# Patient Record
Sex: FEMALE | Race: WHITE | NOT HISPANIC OR LATINO | URBAN - METROPOLITAN AREA
[De-identification: names, ages, dates, MRNs, and addresses within clinical notes are randomized per-mention and may not be internally consistent; named-entity substitution may affect disease eponyms.]

---

## 2017-01-05 ENCOUNTER — INPATIENT (INPATIENT)
Facility: HOSPITAL | Age: 27
LOS: 3 days | Discharge: HOME | DRG: 917 | End: 2017-01-09
Attending: HOSPITALIST | Admitting: INTERNAL MEDICINE
Payer: COMMERCIAL

## 2017-01-05 VITALS
WEIGHT: 160.06 LBS | HEART RATE: 122 BPM | HEIGHT: 65 IN | SYSTOLIC BLOOD PRESSURE: 129 MMHG | OXYGEN SATURATION: 97 % | TEMPERATURE: 98 F | DIASTOLIC BLOOD PRESSURE: 82 MMHG | RESPIRATION RATE: 20 BRPM

## 2017-01-05 DIAGNOSIS — R73.9 HYPERGLYCEMIA, UNSPECIFIED: ICD-10-CM

## 2017-01-05 DIAGNOSIS — T40.1X1A POISONING BY HEROIN, ACCIDENTAL (UNINTENTIONAL), INITIAL ENCOUNTER: ICD-10-CM

## 2017-01-05 DIAGNOSIS — F11.188 OPIOID ABUSE WITH OTHER OPIOID-INDUCED DISORDER: ICD-10-CM

## 2017-01-05 DIAGNOSIS — T38.0X5A ADVERSE EFFECT OF GLUCOCORTICOIDS AND SYNTHETIC ANALOGUES, INITIAL ENCOUNTER: ICD-10-CM

## 2017-01-05 DIAGNOSIS — F41.9 ANXIETY DISORDER, UNSPECIFIED: ICD-10-CM

## 2017-01-05 DIAGNOSIS — J69.0 PNEUMONITIS DUE TO INHALATION OF FOOD AND VOMIT: ICD-10-CM

## 2017-01-05 DIAGNOSIS — T17.908A UNSPECIFIED FOREIGN BODY IN RESPIRATORY TRACT, PART UNSPECIFIED CAUSING OTHER INJURY, INITIAL ENCOUNTER: ICD-10-CM

## 2017-01-05 DIAGNOSIS — D72.828 OTHER ELEVATED WHITE BLOOD CELL COUNT: ICD-10-CM

## 2017-01-05 DIAGNOSIS — Z28.21 IMMUNIZATION NOT CARRIED OUT BECAUSE OF PATIENT REFUSAL: ICD-10-CM

## 2017-01-05 DIAGNOSIS — R21 RASH AND OTHER NONSPECIFIC SKIN ERUPTION: ICD-10-CM
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
## 2017-01-05 DIAGNOSIS — R09.02 HYPOXEMIA: ICD-10-CM

## 2017-01-05 LAB
ALBUMIN SERPL ELPH-MCNC: 4.3 G/DL — SIGNIFICANT CHANGE UP (ref 3.3–5.2)
ALP SERPL-CCNC: 67 U/L — SIGNIFICANT CHANGE UP (ref 40–120)
ALT FLD-CCNC: 13 U/L — SIGNIFICANT CHANGE UP
AMPHET UR-MCNC: NEGATIVE — SIGNIFICANT CHANGE UP
ANION GAP SERPL CALC-SCNC: 25 MMOL/L — HIGH (ref 5–17)
APAP SERPL-MCNC: <9.3 UG/ML — LOW (ref 10–26)
AST SERPL-CCNC: 19 U/L — SIGNIFICANT CHANGE UP
BARBITURATES UR SCN-MCNC: NEGATIVE — SIGNIFICANT CHANGE UP
BASE EXCESS BLDA CALC-SCNC: -4.5 MMOL/L — LOW (ref -3–3)
BENZODIAZ UR-MCNC: NEGATIVE — SIGNIFICANT CHANGE UP
BILIRUB SERPL-MCNC: 0.1 MG/DL — LOW (ref 0.4–2)
BLOOD GAS COMMENTS ARTERIAL: SIGNIFICANT CHANGE UP
BUN SERPL-MCNC: 21 MG/DL — HIGH (ref 8–20)
CALCIUM SERPL-MCNC: 8.7 MG/DL — SIGNIFICANT CHANGE UP (ref 8.6–10.2)
CHLORIDE SERPL-SCNC: 95 MMOL/L — LOW (ref 98–107)
CO2 SERPL-SCNC: 18 MMOL/L — LOW (ref 22–29)
COCAINE METAB.OTHER UR-MCNC: NEGATIVE — SIGNIFICANT CHANGE UP
CREAT SERPL-MCNC: 1.11 MG/DL — SIGNIFICANT CHANGE UP (ref 0.5–1.3)
ETHANOL SERPL-MCNC: <10 MG/DL — SIGNIFICANT CHANGE UP
GAS PNL BLDA: SIGNIFICANT CHANGE UP
GLUCOSE SERPL-MCNC: 269 MG/DL — HIGH (ref 70–115)
HCO3 BLDA-SCNC: 21 MMOL/L — SIGNIFICANT CHANGE UP (ref 20–26)
HCT VFR BLD CALC: 38.5 % — SIGNIFICANT CHANGE UP (ref 37–47)
HGB BLD-MCNC: 12.2 G/DL — SIGNIFICANT CHANGE UP (ref 12–16)
HOROWITZ INDEX BLDA+IHG-RTO: SIGNIFICANT CHANGE UP
LYMPHOCYTES # BLD AUTO: 17 % — LOW (ref 20–55)
MCHC RBC-ENTMCNC: 27.8 PG — SIGNIFICANT CHANGE UP (ref 27–31)
MCHC RBC-ENTMCNC: 31.7 G/DL — LOW (ref 32–36)
MCV RBC AUTO: 87.7 FL — SIGNIFICANT CHANGE UP (ref 81–99)
METHADONE UR-MCNC: NEGATIVE — SIGNIFICANT CHANGE UP
MONOCYTES NFR BLD AUTO: 10 % — SIGNIFICANT CHANGE UP (ref 3–10)
NEUTROPHILS NFR BLD AUTO: 67 % — SIGNIFICANT CHANGE UP (ref 37–73)
NEUTS BAND # BLD: 6 % — SIGNIFICANT CHANGE UP (ref 0–8)
NRBC # BLD: 1 /100 — HIGH (ref 0–0)
OPIATES UR-MCNC: POSITIVE
PCO2 BLDA: 52 MMHG — HIGH (ref 35–45)
PCP SPEC-MCNC: SIGNIFICANT CHANGE UP
PCP UR-MCNC: NEGATIVE — SIGNIFICANT CHANGE UP
PH BLDA: 7.25 — LOW (ref 7.35–7.45)
PLAT MORPH BLD: NORMAL — SIGNIFICANT CHANGE UP
PLATELET # BLD AUTO: 424 K/UL — HIGH (ref 150–400)
PO2 BLDA: 78 MMHG — LOW (ref 83–108)
POTASSIUM SERPL-MCNC: 3.9 MMOL/L — SIGNIFICANT CHANGE UP (ref 3.5–5.3)
POTASSIUM SERPL-SCNC: 3.9 MMOL/L — SIGNIFICANT CHANGE UP (ref 3.5–5.3)
PROT SERPL-MCNC: 7.8 G/DL — SIGNIFICANT CHANGE UP (ref 6.6–8.7)
RBC # BLD: 4.39 M/UL — LOW (ref 4.4–5.2)
RBC # FLD: 13.8 % — SIGNIFICANT CHANGE UP (ref 11–15.6)
RBC BLD AUTO: NORMAL — SIGNIFICANT CHANGE UP
SALICYLATES SERPL-MCNC: <2 MG/DL — LOW (ref 10–20)
SAO2 % BLDA: 93 % — LOW (ref 95–99)
SODIUM SERPL-SCNC: 138 MMOL/L — SIGNIFICANT CHANGE UP (ref 135–145)
THC UR QL: NEGATIVE — SIGNIFICANT CHANGE UP
WBC # BLD: 30.22 K/UL — HIGH (ref 4.8–10.8)
WBC # FLD AUTO: 30.22 K/UL — HIGH (ref 4.8–10.8)

## 2017-01-05 PROCEDURE — 99291 CRITICAL CARE FIRST HOUR: CPT

## 2017-01-05 PROCEDURE — 71010: CPT | Mod: 26

## 2017-01-05 PROCEDURE — 93010 ELECTROCARDIOGRAM REPORT: CPT

## 2017-01-05 RX ORDER — VANCOMYCIN HCL 1 G
VIAL (EA) INTRAVENOUS
Qty: 0 | Refills: 0 | Status: DISCONTINUED | OUTPATIENT
Start: 2017-01-05 | End: 2017-01-06

## 2017-01-05 RX ORDER — SODIUM CHLORIDE 9 MG/ML
1000 INJECTION INTRAMUSCULAR; INTRAVENOUS; SUBCUTANEOUS ONCE
Qty: 0 | Refills: 0 | Status: COMPLETED | OUTPATIENT
Start: 2017-01-05 | End: 2017-01-05

## 2017-01-05 RX ORDER — VANCOMYCIN HCL 1 G
1250 VIAL (EA) INTRAVENOUS ONCE
Qty: 0 | Refills: 0 | Status: COMPLETED | OUTPATIENT
Start: 2017-01-05 | End: 2017-01-05

## 2017-01-05 RX ORDER — AZTREONAM 2 G
VIAL (EA) INJECTION
Qty: 0 | Refills: 0 | Status: DISCONTINUED | OUTPATIENT
Start: 2017-01-05 | End: 2017-01-06

## 2017-01-05 RX ORDER — ONDANSETRON 8 MG/1
4 TABLET, FILM COATED ORAL ONCE
Qty: 0 | Refills: 0 | Status: COMPLETED | OUTPATIENT
Start: 2017-01-05 | End: 2017-01-05

## 2017-01-05 RX ORDER — VANCOMYCIN HCL 1 G
1250 VIAL (EA) INTRAVENOUS EVERY 12 HOURS
Qty: 0 | Refills: 0 | Status: DISCONTINUED | OUTPATIENT
Start: 2017-01-06 | End: 2017-01-06

## 2017-01-05 RX ORDER — NALOXONE HYDROCHLORIDE 4 MG/.1ML
2 SPRAY NASAL
Qty: 2 | Refills: 0 | Status: DISCONTINUED | OUTPATIENT
Start: 2017-01-05 | End: 2017-01-05

## 2017-01-05 RX ORDER — INFLUENZA VIRUS VACCINE 15; 15; 15; 15 UG/.5ML; UG/.5ML; UG/.5ML; UG/.5ML
0.5 SUSPENSION INTRAMUSCULAR ONCE
Qty: 0 | Refills: 0 | Status: DISCONTINUED | OUTPATIENT
Start: 2017-01-05 | End: 2017-01-09

## 2017-01-05 RX ORDER — NALOXONE HYDROCHLORIDE 4 MG/.1ML
2 SPRAY NASAL ONCE
Qty: 0 | Refills: 0 | Status: COMPLETED | OUTPATIENT
Start: 2017-01-05 | End: 2017-01-05

## 2017-01-05 RX ORDER — SODIUM CHLORIDE 9 MG/ML
1000 INJECTION, SOLUTION INTRAVENOUS
Qty: 0 | Refills: 0 | Status: DISCONTINUED | OUTPATIENT
Start: 2017-01-05 | End: 2017-01-06

## 2017-01-05 RX ORDER — AZTREONAM 2 G
1000 VIAL (EA) INJECTION ONCE
Qty: 0 | Refills: 0 | Status: COMPLETED | OUTPATIENT
Start: 2017-01-05 | End: 2017-01-05

## 2017-01-05 RX ORDER — ENOXAPARIN SODIUM 100 MG/ML
40 INJECTION SUBCUTANEOUS DAILY
Qty: 0 | Refills: 0 | Status: DISCONTINUED | OUTPATIENT
Start: 2017-01-05 | End: 2017-01-09

## 2017-01-05 RX ORDER — AZTREONAM 2 G
1000 VIAL (EA) INJECTION EVERY 6 HOURS
Qty: 0 | Refills: 0 | Status: DISCONTINUED | OUTPATIENT
Start: 2017-01-06 | End: 2017-01-06

## 2017-01-05 RX ORDER — ESCITALOPRAM OXALATE 10 MG/1
10 TABLET, FILM COATED ORAL AT BEDTIME
Qty: 0 | Refills: 0 | Status: DISCONTINUED | OUTPATIENT
Start: 2017-01-05 | End: 2017-01-09

## 2017-01-05 RX ADMIN — Medication 166.67 MILLIGRAM(S): at 22:14

## 2017-01-05 RX ADMIN — SODIUM CHLORIDE 75 MILLILITER(S): 9 INJECTION, SOLUTION INTRAVENOUS at 22:15

## 2017-01-05 RX ADMIN — ESCITALOPRAM OXALATE 10 MILLIGRAM(S): 10 TABLET, FILM COATED ORAL at 22:15

## 2017-01-05 RX ADMIN — SODIUM CHLORIDE 4000 MILLILITER(S): 9 INJECTION INTRAMUSCULAR; INTRAVENOUS; SUBCUTANEOUS at 18:08

## 2017-01-05 RX ADMIN — NALOXONE HYDROCHLORIDE 2 MILLIGRAM(S): 4 SPRAY NASAL at 18:17

## 2017-01-05 RX ADMIN — Medication 50 MILLIGRAM(S): at 22:15

## 2017-01-05 RX ADMIN — ONDANSETRON 4 MILLIGRAM(S): 8 TABLET, FILM COATED ORAL at 18:18

## 2017-01-05 NOTE — H&P ADULT. - HISTORY OF PRESENT ILLNESS
26F hx anxiety takes lexapro heroin.  Started smoking heroin last spring.  She injected heroin 3 times in the fall, then was admitted to drug rehab.  She was discharged in December and came to [facility] to live with father for a change of scenery.  Has been sober until today when she had a relapse and did "a lot" of heroin IV at 11:30 am today.  She arrived in ED and was awake.  Several hours later, found cyanotic with vomitus, received narcan, then awoke.  Now hypoxemic with dense L PNA aspiration likely.  She desats to 75% on RA.  She says she developed a rash on face and neck a few days ago and was prescribed oral steroids by [facility]  The rash is getting better per her report.

## 2017-01-05 NOTE — ED PROVIDER NOTE - OBJECTIVE STATEMENT
called to bedside, patient found by nurse unresponsive and blue in color, apneic, not spontaneously breathing no IV with pulse, patient bagged, color improved, narcan given 2mg followed by another 2, patient awoke, admits to using "a lot" of heroin earlier says didn't get narcan groggy, poor historian

## 2017-01-05 NOTE — H&P ADULT. - ASSESSMENT
26 F relapse IV heroin with Likely  aspiration PNA   Leukocytosis at least partially explained by outpatient prednisone for rash.   She was brought to ICU for concern of repeat narcotic effect and hypoxemia.

## 2017-01-05 NOTE — H&P ADULT. - PROBLEM SELECTOR PLAN 1
Watching for narcosis.  Has committed to returning to her outreach Drug counseling program . Watching for narcosis.  Has committed to returning to her outreach Drug counseling program .  Short ICU stay  Will culture blood owing to Leukocytosis

## 2017-01-05 NOTE — ED ADULT NURSE REASSESSMENT NOTE - NS ED NURSE REASSESS COMMENT FT1
Assuming care from previous RN, pt AOx3, pt able to stay awake, denies SOB, denies chest pain, denies n/v, denies pain, LS clear and equal bilaterally, skin warm and dry, patent 20G IV in left wrist, sinus tachycardia on monitor, pt on NRB @ 15L, abrasion to left forehead. Offers no complaints at this time. Will continue to monitor.

## 2017-01-05 NOTE — ED PROVIDER NOTE - PROGRESS NOTE DETAILS
pt more awake after narcan but still sluggish -cxr with ? aspiration, on 100% NRB sometimes desats into the low 90s- ICU called for eval - awaiting call back spoke with ICU for eval

## 2017-01-05 NOTE — ED ADULT TRIAGE NOTE - CHIEF COMPLAINT QUOTE
Pt biba after admittedly using 1 bag of heroin.  Pt was found awake on the floor by a friend.  Pt denies physical complaints other than her ears popping.  Denies SI/HI.  Belonging collected and secured. Pt placed in yellow gown.

## 2017-01-05 NOTE — ED PROVIDER NOTE - CRITICAL CARE PROVIDED
documentation/additional history taking/consultation with other physicians/interpretation of diagnostic studies/consult w/ pt's family directly relating to pts condition/direct patient care (not related to procedure)

## 2017-01-05 NOTE — ED ADULT NURSE REASSESSMENT NOTE - NS ED NURSE REASSESS COMMENT FT1
Pi responded to the narcan. Pt is axox4.Pt is tachycardic and tachipneic.. Pt is stable now. Continue to monitor pt.

## 2017-01-05 NOTE — H&P ADULT. - ATTENDING COMMENTS
26 yof with herion intoxication aspiration PNA - improved today this am ready for downgrade. Anna had been clean for 6 mos then relapsed - she is interested in returning to treatment.    - stable for transfer to floor Dr. Crane accepted  - clindamycin for aspiration PNA  - Social work consult for substance

## 2017-01-05 NOTE — PATIENT PROFILE ADULT. - NS PRO INDICAT FLU
Patient is 18 years or older/Patient lives with or cares for people at high risk for influenza complications

## 2017-01-06 DIAGNOSIS — T40.1X4D: ICD-10-CM

## 2017-01-06 DIAGNOSIS — J69.0 PNEUMONITIS DUE TO INHALATION OF FOOD AND VOMIT: ICD-10-CM

## 2017-01-06 DIAGNOSIS — R21 RASH AND OTHER NONSPECIFIC SKIN ERUPTION: ICD-10-CM

## 2017-01-06 LAB
ANION GAP SERPL CALC-SCNC: 10 MMOL/L — SIGNIFICANT CHANGE UP (ref 5–17)
BUN SERPL-MCNC: 12 MG/DL — SIGNIFICANT CHANGE UP (ref 8–20)
CALCIUM SERPL-MCNC: 8.2 MG/DL — LOW (ref 8.6–10.2)
CHLORIDE SERPL-SCNC: 100 MMOL/L — SIGNIFICANT CHANGE UP (ref 98–107)
CO2 SERPL-SCNC: 29 MMOL/L — SIGNIFICANT CHANGE UP (ref 22–29)
CREAT SERPL-MCNC: 0.61 MG/DL — SIGNIFICANT CHANGE UP (ref 0.5–1.3)
GLUCOSE SERPL-MCNC: 120 MG/DL — HIGH (ref 70–115)
HCT VFR BLD CALC: 33.4 % — LOW (ref 37–47)
HGB BLD-MCNC: 11.1 G/DL — LOW (ref 12–16)
MCHC RBC-ENTMCNC: 28 PG — SIGNIFICANT CHANGE UP (ref 27–31)
MCHC RBC-ENTMCNC: 33.2 G/DL — SIGNIFICANT CHANGE UP (ref 32–36)
MCV RBC AUTO: 84.1 FL — SIGNIFICANT CHANGE UP (ref 81–99)
PLATELET # BLD AUTO: 285 K/UL — SIGNIFICANT CHANGE UP (ref 150–400)
POTASSIUM SERPL-MCNC: 3.7 MMOL/L — SIGNIFICANT CHANGE UP (ref 3.5–5.3)
POTASSIUM SERPL-SCNC: 3.7 MMOL/L — SIGNIFICANT CHANGE UP (ref 3.5–5.3)
PROCALCITONIN SERPL-MCNC: 0.9 NG/ML — HIGH (ref 0–0.5)
RBC # BLD: 3.97 M/UL — LOW (ref 4.4–5.2)
RBC # FLD: 13.5 % — SIGNIFICANT CHANGE UP (ref 11–15.6)
SODIUM SERPL-SCNC: 139 MMOL/L — SIGNIFICANT CHANGE UP (ref 135–145)
WBC # BLD: 19.37 K/UL — HIGH (ref 4.8–10.8)
WBC # FLD AUTO: 19.37 K/UL — HIGH (ref 4.8–10.8)

## 2017-01-06 PROCEDURE — 99233 SBSQ HOSP IP/OBS HIGH 50: CPT

## 2017-01-06 RX ORDER — SACCHAROMYCES BOULARDII 250 MG
250 POWDER IN PACKET (EA) ORAL
Qty: 0 | Refills: 0 | Status: DISCONTINUED | OUTPATIENT
Start: 2017-01-06 | End: 2017-01-09

## 2017-01-06 RX ADMIN — Medication 300 MILLIGRAM(S): at 17:39

## 2017-01-06 RX ADMIN — ESCITALOPRAM OXALATE 10 MILLIGRAM(S): 10 TABLET, FILM COATED ORAL at 21:58

## 2017-01-06 RX ADMIN — ENOXAPARIN SODIUM 40 MILLIGRAM(S): 100 INJECTION SUBCUTANEOUS at 12:32

## 2017-01-06 RX ADMIN — Medication 300 MILLIGRAM(S): at 12:32

## 2017-01-06 RX ADMIN — Medication 300 MILLIGRAM(S): at 23:19

## 2017-01-06 RX ADMIN — Medication 250 MILLIGRAM(S): at 17:39

## 2017-01-06 RX ADMIN — Medication 166.67 MILLIGRAM(S): at 06:01

## 2017-01-06 RX ADMIN — Medication 50 MILLIGRAM(S): at 06:00

## 2017-01-06 NOTE — PROGRESS NOTE ADULT - ASSESSMENT
26yr old with h/o drug abuse (heroine), Anxiety presented to ER after she had overdosed on heroine because "I wanted to get high" Per her she had been clean - for prior 60days and was actively involved in drug rehab program. But she relapsed. She was started on lexapro 2 weeks ago. In the ER, she came in AOX3 She received narcan in ER with good response but code blue was when she turned unresponsive and cyanotic. . Was found to be in respiratory distress 2/2 aspiration event. She was taken to ICU, started on Aztreonam, Vanc for aspiration pna on Lt lung and supportive care, FM O2.

## 2017-01-06 NOTE — PROGRESS NOTE ADULT - SUBJECTIVE AND OBJECTIVE BOX
MEDICINE TRANSFER NOTE      HPI in brief: 26yr old with h/o drug abuse (heroine), Anxiety presented to ER after she had overdosed on heroine because "I wanted to get high" Per her she had been clean - for prior 60days and was actively involved in drug rehab program. But she relapsed. She was started on lexapro 2 weeks ago. In the ER, she came in AOX3 She received narcan in ER with good response but code blue was when she turned unresponsive and cyanotic. . Was found to be in respiratory distress 2/2 aspiration event. She was taken to ICU, started on Aztreonam, Vanc for aspiration pna on Lt lung and supportive care, FM O2.     cc: c/o pain in her chest when she takes a deep breath.         INTERVAL HPI/OVERNIGHT EVENTS: no acute events, stable for transfer to floor this am     MEDICATIONS  (STANDING):  escitalopram 10milliGRAM(s) Oral at bedtime  vancomycin  IVPB  IV Intermittent   aztreonam  IVPB 1000milliGRAM(s) IV Intermittent every 6 hours  vancomycin  IVPB 1250milliGRAM(s) IV Intermittent every 12 hours  aztreonam  IVPB  IV Intermittent   enoxaparin Injectable 40milliGRAM(s) SubCutaneous daily  plasma-lyte A. 1000milliLiter(s) IV Continuous <Continuous>  influenza   Vaccine 0.5milliLiter(s) IntraMuscular once    MEDICATIONS  (PRN):      Allergies    Allergy Status Unknown    Intolerances        REVIEW OF SYSTEMS    No fever/chills,no rashes.   no palpitations/pedal swelling.  No dairrhea/Nausea/vomiting.   No black stools/blood in stools.   No dysuria.   No focal weakness/numbness  No Seizures      Vital Signs Last 24 Hrs  T(C): 37.2, Max: 37.3 (01-05 @ 20:30)  T(F): 98.9, Max: 99.2 (01-05 @ 20:30)  HR: 81 (80 - 122)  BP: 98/56 (85/47 - 134/101)  BP(mean): 70 (60 - 93)  RR: 38 (0 - 58)  SpO2: 94% (75% - 100%)        PHYSICAL EXAM:    GENERAL: NAD, well-groomed, well-developed  HEAD:  Atraumatic, Normocephalic  EYES: EOMI, PERRLA, conjunctiva and sclera clear  ENMT: Moist mucous membranes  NECK: Supple, No JVD, maculo papular rash on front chest on Rt side of the neck - healing stage. No vesicles/oozing seen, non-tender  NERVOUS SYSTEM:  Alert & Oriented X3, Good concentration  CHEST/LUNG: Clear to auscultation bilaterally; No rales, rhonchi, wheezing, or rubs  HEART: Regular rate and rhythm; No murmurs, rubs, or gallops  ABDOMEN: Soft, Nontender, Nondistended; Bowel sounds present  EXTREMITIES:  2+ Peripheral Pulses, No clubbing, cyanosis, or edema  LYMPH: No lymphadenopathy noted  SKIn - RASH as above              LABS:                        11.1   19.37 )-----------( 285      ( 06 Jan 2017 06:35 )             33.4     06 Jan 2017 06:36    139    |  100    |  12.0   ----------------------------<  120    3.7     |  29.0   |  0.61     Ca    8.2        06 Jan 2017 06:36    TPro  7.8    /  Alb  4.3    /  TBili  0.1    /  DBili  x      /  AST  19     /  ALT  13     /  AlkPhos  67     05 Jan 2017 17:45          RADIOLOGY & ADDITIONAL TESTS: CXR - reviewed  EKG - tracing seen - NSR MEDICINE TRANSFER NOTE      HPI in brief: 26yr old with h/o drug abuse (heroine), Anxiety presented to ER after she had overdosed on heroine because "I wanted to get high" denies any Suicidal intent. Per her she had been clean - for prior 60days and was actively involved in drug rehab program. But she relapsed. She was started on lexapro 2 weeks ago. In the ER, she came in AOX3 She received narcan in ER with good response but code blue was when she turned unresponsive and cyanotic. . Was found to be in respiratory distress 2/2 aspiration event. She was taken to ICU, started on Aztreonam, Vanc for aspiration pna on Lt lung and supportive care, FM O2.     cc: c/o pain in her chest when she takes a deep breath.         INTERVAL HPI/OVERNIGHT EVENTS: no acute events, stable for transfer to floor this am     MEDICATIONS  (STANDING):  escitalopram 10milliGRAM(s) Oral at bedtime  vancomycin  IVPB  IV Intermittent   aztreonam  IVPB 1000milliGRAM(s) IV Intermittent every 6 hours  vancomycin  IVPB 1250milliGRAM(s) IV Intermittent every 12 hours  aztreonam  IVPB  IV Intermittent   enoxaparin Injectable 40milliGRAM(s) SubCutaneous daily  plasma-lyte A. 1000milliLiter(s) IV Continuous <Continuous>  influenza   Vaccine 0.5milliLiter(s) IntraMuscular once    MEDICATIONS  (PRN):      Allergies    Allergy Status Unknown    Intolerances        REVIEW OF SYSTEMS    No fever/chills,no rashes.   no palpitations/pedal swelling.  No dairrhea/Nausea/vomiting.   No black stools/blood in stools.   No dysuria.   No focal weakness/numbness  No Seizures      Vital Signs Last 24 Hrs  T(C): 37.2, Max: 37.3 (01-05 @ 20:30)  T(F): 98.9, Max: 99.2 (01-05 @ 20:30)  HR: 81 (80 - 122)  BP: 98/56 (85/47 - 134/101)  BP(mean): 70 (60 - 93)  RR: 38 (0 - 58)  SpO2: 94% (75% - 100%)        PHYSICAL EXAM:    GENERAL: NAD, well-groomed, well-developed  HEAD:  Atraumatic, Normocephalic  EYES: EOMI, PERRLA, conjunctiva and sclera clear  ENMT: Moist mucous membranes  NECK: Supple, No JVD, maculo papular rash on front chest on Rt side of the neck - healing stage. No vesicles/oozing seen, non-tender  NERVOUS SYSTEM:  Alert & Oriented X3, Good concentration  CHEST/LUNG: Clear to auscultation bilaterally; No rales, rhonchi, wheezing, or rubs  HEART: Regular rate and rhythm; No murmurs, rubs, or gallops  ABDOMEN: Soft, Nontender, Nondistended; Bowel sounds present  EXTREMITIES:  2+ Peripheral Pulses, No clubbing, cyanosis, or edema  LYMPH: No lymphadenopathy noted  SKIn - RASH as above              LABS:                        11.1   19.37 )-----------( 285      ( 06 Jan 2017 06:35 )             33.4     06 Jan 2017 06:36    139    |  100    |  12.0   ----------------------------<  120    3.7     |  29.0   |  0.61     Ca    8.2        06 Jan 2017 06:36    TPro  7.8    /  Alb  4.3    /  TBili  0.1    /  DBili  x      /  AST  19     /  ALT  13     /  AlkPhos  67     05 Jan 2017 17:45          RADIOLOGY & ADDITIONAL TESTS: CXR - reviewed  EKG - tracing seen - NSR

## 2017-01-07 LAB
ANION GAP SERPL CALC-SCNC: 9 MMOL/L — SIGNIFICANT CHANGE UP (ref 5–17)
BUN SERPL-MCNC: 11 MG/DL — SIGNIFICANT CHANGE UP (ref 8–20)
CALCIUM SERPL-MCNC: 8.2 MG/DL — LOW (ref 8.6–10.2)
CHLORIDE SERPL-SCNC: 103 MMOL/L — SIGNIFICANT CHANGE UP (ref 98–107)
CO2 SERPL-SCNC: 31 MMOL/L — HIGH (ref 22–29)
CREAT SERPL-MCNC: 0.53 MG/DL — SIGNIFICANT CHANGE UP (ref 0.5–1.3)
GLUCOSE SERPL-MCNC: 91 MG/DL — SIGNIFICANT CHANGE UP (ref 70–115)
HCT VFR BLD CALC: 32.4 % — LOW (ref 37–47)
HGB BLD-MCNC: 10.3 G/DL — LOW (ref 12–16)
MCHC RBC-ENTMCNC: 27.2 PG — SIGNIFICANT CHANGE UP (ref 27–31)
MCHC RBC-ENTMCNC: 31.8 G/DL — LOW (ref 32–36)
MCV RBC AUTO: 85.7 FL — SIGNIFICANT CHANGE UP (ref 81–99)
PLATELET # BLD AUTO: 241 K/UL — SIGNIFICANT CHANGE UP (ref 150–400)
POTASSIUM SERPL-MCNC: 3.5 MMOL/L — SIGNIFICANT CHANGE UP (ref 3.5–5.3)
POTASSIUM SERPL-SCNC: 3.5 MMOL/L — SIGNIFICANT CHANGE UP (ref 3.5–5.3)
RBC # BLD: 3.78 M/UL — LOW (ref 4.4–5.2)
RBC # FLD: 13.6 % — SIGNIFICANT CHANGE UP (ref 11–15.6)
SODIUM SERPL-SCNC: 143 MMOL/L — SIGNIFICANT CHANGE UP (ref 135–145)
WBC # BLD: 9.09 K/UL — SIGNIFICANT CHANGE UP (ref 4.8–10.8)
WBC # FLD AUTO: 9.09 K/UL — SIGNIFICANT CHANGE UP (ref 4.8–10.8)

## 2017-01-07 PROCEDURE — 99232 SBSQ HOSP IP/OBS MODERATE 35: CPT

## 2017-01-07 RX ADMIN — Medication 300 MILLIGRAM(S): at 06:06

## 2017-01-07 RX ADMIN — ENOXAPARIN SODIUM 40 MILLIGRAM(S): 100 INJECTION SUBCUTANEOUS at 12:11

## 2017-01-07 RX ADMIN — Medication 250 MILLIGRAM(S): at 06:06

## 2017-01-07 RX ADMIN — ESCITALOPRAM OXALATE 10 MILLIGRAM(S): 10 TABLET, FILM COATED ORAL at 23:27

## 2017-01-07 RX ADMIN — Medication 300 MILLIGRAM(S): at 18:37

## 2017-01-07 RX ADMIN — Medication 250 MILLIGRAM(S): at 17:37

## 2017-01-07 RX ADMIN — Medication 300 MILLIGRAM(S): at 23:27

## 2017-01-07 RX ADMIN — Medication 300 MILLIGRAM(S): at 12:11

## 2017-01-07 NOTE — PROGRESS NOTE ADULT - SUBJECTIVE AND OBJECTIVE BOX
HPI in brief: 26yr old with h/o drug abuse (heroine), Anxiety presented to ER after she had overdosed on heroine because "I wanted to get high" denies any Suicidal intent. Per her she had been clean - for prior 60days and was actively involved in drug rehab program. But she relapsed. She was started on lexapro 2 weeks ago. In the ER, she came in AOX3 She received narcan in ER with good response but code blue was when she turned unresponsive and cyanotic. . Was found to be in respiratory distress 2/2 aspiration event. She was taken to ICU, started on Aztreonam, Vanc for aspiration pna on Lt lung and supportive care, FM O2.     cc: c/o chest pain - better, cough and breathing are better, refused inpatient rehab      INTERVAL HPI/OVERNIGHT EVENTS: no acute events    MEDICATIONS  (STANDING):  escitalopram 10milliGRAM(s) Oral at bedtime  enoxaparin Injectable 40milliGRAM(s) SubCutaneous daily  influenza   Vaccine 0.5milliLiter(s) IntraMuscular once  clindamycin   Capsule 300milliGRAM(s) Oral every 6 hours  saccharomyces boulardii 250milliGRAM(s) Oral two times a day      Allergies    Allergy Status Unknown    Intolerances        REVIEW OF SYSTEMS    No fever/chills,no rashes.   no palpitations/pedal swelling.  No diarrhea/Nausea/vomiting.   No black stools/blood in stools.   No dysuria.   No focal weakness/numbness  No Seizures      Vital Signs Last 24 Hrs  T(C): 36.9, Max: 36.9 (01-06 @ 23:23)  T(F): 98.5, Max: 98.5 (01-06 @ 23:23)  HR: 86 (86 - 86)  BP: 106/67 (106/67 - 106/67)  BP(mean): --  RR: 18 (18 - 20)  SpO2: 98% (98% - 98%)        PHYSICAL EXAM:    GENERAL: NAD, well-groomed, well-developed  HEAD:  Atraumatic, Normocephalic  EYES: EOMI, PERRLA, conjunctiva and sclera clear  ENMT: Moist mucous membranes  NECK: Supple, No JVD, maculo papular rash on front chest on Rt side of the neck - healing stage. No vesicles/oozing seen, non-tender - improved today  NERVOUS SYSTEM:  Alert & Oriented X3, Good concentration  CHEST/LUNG: Clear to auscultation bilaterally; No rales, rhonchi, wheezing, or rubs  HEART: Regular rate and rhythm; No murmurs, rubs, or gallops  ABDOMEN: Soft, Nontender, Nondistended; Bowel sounds present  EXTREMITIES:  2+ Peripheral Pulses, No clubbing, cyanosis, or edema  SKIn - RASH as above              LABS:                                              10.3   9.09  )-----------( 241      ( 07 Jan 2017 06:48 )             32.4   07 Jan 2017 06:48    143    |  103    |  11.0   ----------------------------<  91     3.5     |  31.0   |  0.53     Ca    8.2        07 Jan 2017 06:48    TPro  7.8    /  Alb  4.3    /  TBili  0.1    /  DBili  x      /  AST  19     /  ALT  13     /  AlkPhos  67     05 Jan 2017 17:45          RADIOLOGY & ADDITIONAL TESTS: CXR - reviewed  EKG - tracing seen - NSR

## 2017-01-08 VITALS
HEART RATE: 83 BPM | TEMPERATURE: 99 F | DIASTOLIC BLOOD PRESSURE: 64 MMHG | OXYGEN SATURATION: 94 % | SYSTOLIC BLOOD PRESSURE: 114 MMHG | RESPIRATION RATE: 18 BRPM

## 2017-01-08 PROCEDURE — 99232 SBSQ HOSP IP/OBS MODERATE 35: CPT

## 2017-01-08 RX ADMIN — Medication 250 MILLIGRAM(S): at 05:12

## 2017-01-08 RX ADMIN — ENOXAPARIN SODIUM 40 MILLIGRAM(S): 100 INJECTION SUBCUTANEOUS at 12:27

## 2017-01-08 RX ADMIN — Medication 250 MILLIGRAM(S): at 17:34

## 2017-01-08 RX ADMIN — Medication 300 MILLIGRAM(S): at 12:28

## 2017-01-08 RX ADMIN — ESCITALOPRAM OXALATE 10 MILLIGRAM(S): 10 TABLET, FILM COATED ORAL at 22:09

## 2017-01-08 RX ADMIN — Medication 300 MILLIGRAM(S): at 17:34

## 2017-01-08 RX ADMIN — Medication 300 MILLIGRAM(S): at 05:12

## 2017-01-08 RX ADMIN — Medication 300 MILLIGRAM(S): at 22:09

## 2017-01-08 NOTE — DISCHARGE NOTE ADULT - CARE PLAN
Principal Discharge DX:	Diacetylmorphine overdose, undetermined intent, subsequent encounter  Goal:	absteinence  Instructions for follow-up, activity and diet:	f/u with rehab  activity as tolerated  diet - regular  Secondary Diagnosis:	Aspiration pneumonia of left lower lobe, unspecified aspiration pneumonia type  Goal:	complete antibiotics  Secondary Diagnosis:	Anxiety  Instructions for follow-up, activity and diet:	f/u with psychiatry  Secondary Diagnosis:	Skin rash  Goal:	resolved Principal Discharge DX:	Diacetylmorphine overdose, undetermined intent, subsequent encounter  Goal:	absteinence  Instructions for follow-up, activity and diet:	f/u with rehab  activity as tolerated  diet - regular  counselling done - to abstain  Secondary Diagnosis:	Aspiration pneumonia of left lower lobe, unspecified aspiration pneumonia type  Goal:	complete antibiotics  Secondary Diagnosis:	Anxiety  Instructions for follow-up, activity and diet:	f/u with psychiatry  Secondary Diagnosis:	Skin rash  Goal:	resolved

## 2017-01-08 NOTE — PROGRESS NOTE ADULT - PROBLEM SELECTOR PROBLEM 1
Diacetylmorphine overdose, undetermined intent, subsequent encounter

## 2017-01-08 NOTE — PROGRESS NOTE ADULT - PROBLEM SELECTOR PROBLEM 2
Aspiration pneumonia of left lower lobe, unspecified aspiration pneumonia type

## 2017-01-08 NOTE — PROGRESS NOTE ADULT - PROBLEM SELECTOR PLAN 4
?drug allergic rash  Healing stage - ct to monitor
?drug allergic rash  Healing stage - ct to monitor
?drug allergic rash  Healing stage - ct to monitor  topical steroid if does not ct to improve

## 2017-01-08 NOTE — DISCHARGE NOTE ADULT - HOSPITAL COURSE
26yr old with h/o drug abuse (heroine), Anxiety presented to ER after she had overdosed on heroine because "I wanted to get high" denies any Suicidal intent. Per her she had been clean - for prior 60days and was actively involved in drug rehab program. But she relapsed. She was started on lexapro 2 weeks ago. In the ER, she came in AOX3 She received narcan in ER with good response but code blue was when she turned unresponsive and cyanotic. . Was found to be in respiratory distress 2/2 aspiration event. She was taken to ICU, started on Aztreonam, Vanc for aspiration pna on Lt lung and supportive care, FM O2. She was switched to Clindamycin po and medically stable for discharge. She refused inpatient rehab - wants to do Aggressive outpt rehab. 26yr old with h/o drug abuse (heroine), Anxiety presented to ER after she had overdosed on heroine because "I wanted to get high" denies any Suicidal intent. Per her she had been clean - for prior 60days and was actively involved in drug rehab program. But she relapsed. She was started on lexapro 2 weeks ago. In the ER, she came in AOX3 She received narcan in ER with good response but code blue was when she turned unresponsive and cyanotic. . Was found to be in respiratory distress 2/2 aspiration event. She was taken to ICU, started on Aztreonam, Vanc for aspiration pna on Lt lung and supportive care, FM O2, transitioned to RA without difficuity. She was switched to Clindamycin po and medically stable for discharge. She refused inpatient rehab - wants to do Aggressive outpt rehab.     Vital Signs Last 24 Hrs  T(C): 37.3, Max: 37.3 (01-08 @ 18:30)  T(F): 99.1, Max: 99.1 (01-08 @ 18:30)  HR: 83 (67 - 83)  BP: 114/64 (114/64 - 118/82)  BP(mean): --  RR: 18 (16 - 18)  SpO2: 94% (92% - 96%)    PHYSICAL EXAM:    GENERAL: NAD, well-groomed, well-developed  HEAD:  Atraumatic, Normocephalic  EYES: EOMI, PERRLA, conjunctiva and sclera clear  ENMT: No tonsillar erythema, exudates, or enlargement; Moist mucous membranes, Good dentition, No lesions  NECK: Supple, No JVD, Normal thyroid  NERVOUS SYSTEM:  Alert & Oriented X3  CHEST/LUNG: Clear to auscultation bilaterally; No rales, rhonchi, wheezing, or rubs  HEART: Regular rate and rhythm; No murmurs, rubs, or gallops  ABDOMEN: Soft, Nontender, Nondistended; Bowel sounds present  EXTREMITIES:  2+ Peripheral Pulses, No clubbing, cyanosis, or edema  SKIN: macerated rash on Lt cheek , maculopapular rash healing stage on neck and chest

## 2017-01-08 NOTE — PROGRESS NOTE ADULT - PROBLEM SELECTOR PLAN 2
Ct antibiotics  - titrate O2 down - ambulate   Pen allergy  f/u cultures - not drawn yet
Ct antibiotics  - titrate O2 down - ambulate   Pen allergy  f/u cultures - not drawn yet
Ct antibiotics  Pen allergy  f/u cultures - not drawn yet

## 2017-01-08 NOTE — DISCHARGE NOTE ADULT - PLAN OF CARE
absteinence f/u with rehab  activity as tolerated  diet - regular complete antibiotics resolved f/u with psychiatry f/u with rehab  activity as tolerated  diet - regular  counselling done - to abstain

## 2017-01-08 NOTE — DISCHARGE NOTE ADULT - PATIENT PORTAL LINK FT
“You can access the [facility] Patient Portal, offered by [facility], by registering with the following website: [email or web address]”

## 2017-01-08 NOTE — PROGRESS NOTE ADULT - SUBJECTIVE AND OBJECTIVE BOX
HPI in brief: 26yr old with h/o drug abuse (heroine), Anxiety presented to ER after she had overdosed on heroine because "I wanted to get high" denies any Suicidal intent. Per her she had been clean - for prior 60days and was actively involved in drug rehab program. But she relapsed. She was started on lexapro 2 weeks ago. In the ER, she came in AOX3 She received narcan in ER with good response but code blue was when she turned unresponsive and cyanotic. . Was found to be in respiratory distress 2/2 aspiration event. She was taken to ICU, started on Aztreonam, Vanc for aspiration pna on Lt lung and supportive care, FM O2.     cc: feels  better, cough and breathing are better, refused inpatient rehab      INTERVAL HPI/OVERNIGHT EVENTS: no acute events    MEDICATIONS  (STANDING):  escitalopram 10milliGRAM(s) Oral at bedtime  enoxaparin Injectable 40milliGRAM(s) SubCutaneous daily  influenza   Vaccine 0.5milliLiter(s) IntraMuscular once  clindamycin   Capsule 300milliGRAM(s) Oral every 6 hours  saccharomyces boulardii 250milliGRAM(s) Oral two times a day    MEDICATIONS  (PRN):        Allergies    Allergy Status Unknown    Intolerances        REVIEW OF SYSTEMS    No fever/chills,no rashes.   no palpitations/pedal swelling.  No diarrhea/Nausea/vomiting.   No black stools/blood in stools.   No dysuria.   No focal weakness/numbness  No Seizures    Vital Signs Last 24 Hrs  T(C): 36.5, Max: 36.5 (01-07 @ 23:28)  T(F): 97.7, Max: 97.7 (01-07 @ 23:28)  HR: 65 (65 - 65)  BP: 112/72 (112/72 - 112/72)  BP(mean): --  RR: 18 (18 - 18)  SpO2: 92% (92% - 98%)      PHYSICAL EXAM:    GENERAL: NAD, well-groomed, well-developed  HEAD:  Atraumatic, Normocephalic  EYES: EOMI, PERRLA, conjunctiva and sclera clear  ENMT: Moist mucous membranes  NECK: Supple, No JVD, maculo papular rash on front chest on Rt side of the neck - healing stage. No vesicles/oozing seen, non-tender - improved today  NERVOUS SYSTEM:  Alert & Oriented X3, Good concentration  CHEST/LUNG: Clear to auscultation bilaterally; No rales, rhonchi, wheezing, or rubs  HEART: Regular rate and rhythm; No murmurs, rubs, or gallops  ABDOMEN: Soft, Nontender, Nondistended; Bowel sounds present  EXTREMITIES:  2+ Peripheral Pulses, No clubbing, cyanosis, or edema  SKIn - RASH as above              LABS:                                              10.3   9.09  )-----------( 241      ( 07 Jan 2017 06:48 )             32.4   07 Jan 2017 06:48    143    |  103    |  11.0   ----------------------------<  91     3.5     |  31.0   |  0.53     Ca    8.2        07 Jan 2017 06:48    TPro  7.8    /  Alb  4.3    /  TBili  0.1    /  DBili  x      /  AST  19     /  ALT  13     /  AlkPhos  67     05 Jan 2017 17:45          RADIOLOGY & ADDITIONAL TESTS: CXR - reviewed  EKG - tracing seen - NSR

## 2017-01-08 NOTE — DISCHARGE NOTE ADULT - MEDICATION SUMMARY - MEDICATIONS TO TAKE
I will START or STAY ON the medications listed below when I get home from the hospital:    escitalopram 10 mg oral tablet  -- 1 tab(s) by mouth once a day (at bedtime)  -- Indication: For Anxiety    clindamycin 300 mg oral capsule  -- 1 cap(s) by mouth every 6 hours  -- Indication: For Aspiration pneumonia of left lower lobe, unspecified aspiration pneumonia type

## 2017-01-08 NOTE — PROGRESS NOTE ADULT - PROBLEM SELECTOR PLAN 1
Social work eval - utox positive - refused inpt rehab - wants to go to Intensive OP   motivated to go for rehab program - OP  Ct to monitor
Social work eval - utox positive - refused inpt rehab - wants to go to Intensive OP   motivated to go for rehab program - OP  Ct to monitor
Social work eval - utox positive  motivated to go for rehab program  Ct to monitor

## 2017-01-09 PROCEDURE — 99291 CRITICAL CARE FIRST HOUR: CPT | Mod: 25

## 2017-01-09 PROCEDURE — 71045 X-RAY EXAM CHEST 1 VIEW: CPT

## 2017-01-09 PROCEDURE — 96375 TX/PRO/DX INJ NEW DRUG ADDON: CPT

## 2017-01-09 PROCEDURE — 84702 CHORIONIC GONADOTROPIN TEST: CPT

## 2017-01-09 PROCEDURE — 85027 COMPLETE CBC AUTOMATED: CPT

## 2017-01-09 PROCEDURE — 84145 PROCALCITONIN (PCT): CPT

## 2017-01-09 PROCEDURE — 80048 BASIC METABOLIC PNL TOTAL CA: CPT

## 2017-01-09 PROCEDURE — 36415 COLL VENOUS BLD VENIPUNCTURE: CPT

## 2017-01-09 PROCEDURE — 82803 BLOOD GASES ANY COMBINATION: CPT

## 2017-01-09 PROCEDURE — 93005 ELECTROCARDIOGRAM TRACING: CPT

## 2017-01-09 PROCEDURE — 80307 DRUG TEST PRSMV CHEM ANLYZR: CPT

## 2017-01-09 PROCEDURE — 96374 THER/PROPH/DIAG INJ IV PUSH: CPT

## 2017-01-09 PROCEDURE — 80053 COMPREHEN METABOLIC PANEL: CPT

## 2017-01-09 PROCEDURE — 99238 HOSP IP/OBS DSCHRG MGMT 30/<: CPT

## 2017-01-09 RX ORDER — ESCITALOPRAM OXALATE 10 MG/1
1 TABLET, FILM COATED ORAL
Qty: 0 | Refills: 0 | COMMUNITY
Start: 2017-01-09

## 2017-01-09 RX ADMIN — Medication 250 MILLIGRAM(S): at 06:16

## 2017-01-09 RX ADMIN — Medication 300 MILLIGRAM(S): at 12:46

## 2017-01-09 RX ADMIN — Medication 300 MILLIGRAM(S): at 06:16

## 2017-04-28 NOTE — ED ADULT TRIAGE NOTE - AS TEMP SITE
Patient is asking for a return call in regards to how long patient should be off her MS medication.  Patient can be reached at the above number.   oral
